# Patient Record
(demographics unavailable — no encounter records)

---

## 2025-01-03 NOTE — ASSESSMENT
[FreeTextEntry1] : HODA has left grade 2-3 vesicoureteral reflux and is doing well clinically thus far on prophylactic antibiotics and serial imaging. Prophylaxis was adjusted for weight but no other modifications in management was made at this time. The next visit in 6 months will be after a VCUG to determine if the reflux has resolved.

## 2025-01-03 NOTE — REASON FOR VISIT
[Initial Consultation] : an initial consultation [Hydronephrosis] : hydronephrosis [Parents] : parents [TextBox_8] : Dr. Kandice Barber

## 2025-01-03 NOTE — CONSULT LETTER
[FreeTextEntry1] : Dear Dr. ROXIE MOHR ,  I had the pleasure of seeing  HODA LYMAN for follow up today.  Below is my note regarding the office visit today.  Thank you so very much for allowing me to participate in HODA's  care.  Please don't hesitate to call me should any questions or issues arise .  Sincerely,   Sefernio Servin MD, FACS, [facility] Chief, Pediatric Urology Professor of Urology and Pediatrics [employer]  President, American U[employer] Association - [place] Section Past-President, Societies for Pediatric Urology

## 2025-01-03 NOTE — CONSULT LETTER
[FreeTextEntry1] : Dear Dr. ROIXE MOHR ,  I had the pleasure of seeing  HOAD LYMAN for follow up today.  Below is my note regarding the office visit today.  Thank you so very much for allowing me to participate in HODA's  care.  Please don't hesitate to call me should any questions or issues arise .  Sincerely,   Seferino Servin MD, FACS, [facility] Chief, Pediatric Urology Professor of Urology and Pediatrics [facility] [employer]  President, American U[employer] Association - [place] Section Past-President, Societies for Pediatric Urology

## 2025-01-03 NOTE — HISTORY OF PRESENT ILLNESS
[TextBox_4] : HODA is here for a follow up visit for hydronephrosis.  A renal ultrasound (6/13/24) demonstrated bilateral grade 1 hydronephrosis.  USVCUG (7/1024) demonstrated left grade 2-3 reflux.  She returns today for repeat renal/bladder ultrasounds.  Since the last visit, he has been well without any UTIs, unexplained fevers, voiding complaints, issues feeding.

## 2025-02-21 NOTE — CONSULT LETTER
[FreeTextEntry1] : Dear Dr. ROXIE MOHR ,  I had the pleasure of seeing  HODA LYMAN for follow up today.  Below is my note regarding the office visit today.  Thank you so very much for allowing me to participate in HODA's  care.  Please don't hesitate to call me should any questions or issues arise .  Sincerely,   Seferino Servin MD, FACS, [facility] Chief, Pediatric Urology Professor of Urology and Pediatrics [facility] [employer]  President, American U[employer] Association - [place] Section Past-President, Societies for Pediatric Urology

## 2025-02-21 NOTE — CONSULT LETTER
[FreeTextEntry1] : Dear Dr. ROXIE MOHR ,  I had the pleasure of seeing  HODA LYMAN for follow up today.  Below is my note regarding the office visit today.  Thank you so very much for allowing me to participate in HODA's  care.  Please don't hesitate to call me should any questions or issues arise .  Sincerely,   Seferino Servin MD, FACS, [facility] Chief, Pediatric Urology Professor of Urology and Pediatrics [facility] [employer]  President, American U[employer] Association - [place] Section Past-President, Societies for Pediatric Urology

## 2025-02-21 NOTE — HISTORY OF PRESENT ILLNESS
[TextBox_4] : HODA is here for a follow up visit for hydronephrosis. A renal ultrasound (6/13/24) demonstrated bilateral grade 1 hydronephrosis. USVCUG (7/1024) demonstrated left grade 2-3 reflux. She returns today for repeat renal/bladder ultrasounds. Since the last visit, he has been well without any UTIs, unexplained fevers, voiding complaints, issues feeding. Bactrim prophylaxis without side effects.

## 2025-04-05 NOTE — CONSULT LETTER
[FreeTextEntry1] : Dear Dr. ROXIE MOHR ,  I had the pleasure of seeing  HODA LYMAN for follow up today.  Below is my note regarding the office visit today.  Thank you so very much for allowing me to participate in HODA's  care.  Please don't hesitate to call me should any questions or issues arise .  Sincerely,   Seferino Servin MD, FACS, [facility] Chief, Pediatric Urology Professor of Urology and Pediatrics [facility] [employer]  President, American U[employer] Association - [place] Section Past-President, Societies for Pediatric Urology

## 2025-04-05 NOTE — CONSULT LETTER
[FreeTextEntry1] : Dear Dr. ROXIE MOHR ,  I had the pleasure of seeing  HODA LYMAN for follow up today.  Below is my note regarding the office visit today.  Thank you so very much for allowing me to participate in HODA's  care.  Please don't hesitate to call me should any questions or issues arise .  Sincerely,   Seferino Servin MD, FACS, [facility] Chief, Pediatric Urology Professor of Urology and Pediatrics [facility] [employer]  President, American U[employer] Association - [place] Section Past-President, Societies for Pediatric Urology

## 2025-04-05 NOTE — HISTORY OF PRESENT ILLNESS
[TextBox_4] : HODA is here for a follow up visit for hydronephrosis. A renal ultrasound (6/13/24) demonstrated bilateral grade 1 hydronephrosis. USVCUG (7/1024) demonstrated left grade 2-3 reflux. She returns today for repeat renal/bladder ultrasounds. Since the last visit, he has been well without any UTIs, unexplained fevers, voiding complaints, issues feeding. Bactrim prophylaxis without side effects.  Deep Sutures: 5-0 Vicryl

## 2025-06-26 NOTE — REASON FOR VISIT
[Home] : at home, [unfilled] , at the time of the visit. [Medical Office: ([facility])___] : at the medical office located in  [Telehealth (audio & video)] : This visit was provided via telehealth using real-time 2-way audio visual technology. [Follow-Up Visit] : a follow-up visit [Hydronephrosis] : hydronephrosis [VUR] : vesicoureteral reflux [Parents] : parents

## 2025-06-26 NOTE — REASON FOR VISIT
[Home] : at home, [unfilled] , at the time of the visit. [Medical Office: ([facility])___] : at the medical office located in  [Telehealth (audio & video)] : This visit was provided via telehealth using real-time 2-way audio visual technology. [Follow-Up Visit] : a follow-up visit [Hydronephrosis] : hydronephrosis [VUR] : vesicoureteral reflux [Parents] : parents

## 2025-06-26 NOTE — REASON FOR VISIT
[Home] : at home, [unfilled] , at the time of the visit. [Medical Office: ([facility])___] : at the medical office located in  [Telehealth (audio & video)] : This visit was provided via telehealth using real-time 2-way audio visual technology. [Follow-Up Visit] : a follow-up visit [Hydronephrosis] : hydronephrosis [VUR] : vesicoureteral reflux [Parents] : parents

## 2025-06-26 NOTE — REASON FOR VISIT
[Home] : at home, [unfilled] , at the time of the visit. [Medical Office: ([facility])___] : at the medical office located in  [Telehealth (audio & video)] : This visit was provided via telehealth using real-time 2-way audio visual technology. [Follow-Up Visit] : a follow-up visit [Hydronephrosis] : hydronephrosis [VUR] : vesicoureteral reflux [Parents] : parents

## 2025-07-16 NOTE — ASSESSMENT
[FreeTextEntry1] : Sofie has unchanged left grade 2-3 left vesicoureteral reflux.  After a long discussion regarding reflux and its management including observation, prophylactic antibiotics, Deflux injection or ureteral reimplantation, as well as answering all questions regarding the risks and benefits of each option, the decision to maintain prophylactic antibiotics and serial imaging (sonogram in 6 months and VCUG in 1 year) was made.

## 2025-07-16 NOTE — CONSULT LETTER
[FreeTextEntry1] : Dear Dr. ROXIE MOHR,      I had the pleasure of seeing HODA LYMAN for follow up today.  Below is my note regarding the office visit today.      Thank you so very much for allowing me to participate in HODA's care.  Please don't hesitate to call me should any questions or issues arise.         Sincerely,     Seferino Servin MD, FACS, [facility]    Chief, Pediatric Urology     Professor of Urology and Pediatrics     [employer] [facility] [employer]         President, American Urological Association - [place] Section    Past-President, Societies for Pediatric Urology

## 2025-07-16 NOTE — CONSULT LETTER
[FreeTextEntry1] : Dear Dr. ROXIE MOHR,      I had the pleasure of seeing HODA LYMAN for follow up today.  Below is my note regarding the office visit today.      Thank you so very much for allowing me to participate in HODA's care.  Please don't hesitate to call me should any questions or issues arise.         Sincerely,     Seferino Servin MD, FACS, [facility]    Chief, Pediatric Urology     Professor of Urology and Pediatrics     [facility] [employer]         President, American Urological Association - [place] Section    Past-President, Societies for Pediatric Urology

## 2025-07-16 NOTE — CONSULT LETTER
[FreeTextEntry1] : Dear Dr. ROXIE MOHR,      I had the pleasure of seeing HODA LYMAN for follow up today.  Below is my note regarding the office visit today.      Thank you so very much for allowing me to participate in HODA's care.  Please don't hesitate to call me should any questions or issues arise.         Sincerely,     Seferino Servin MD, FACS, [facility]    Chief, Pediatric Urology     Professor of Urology and Pediatrics     [facility] [employer]         President, American Urological Association - [place] Section    Past-President, Societies for Pediatric Urology

## 2025-07-16 NOTE — HISTORY OF PRESENT ILLNESS
[TextBox_4] : I verified the identity of the patient and the reason for the appointment with the parent. I explained to the parent that telemedicine encounters are not the same as a direct patient/healthcare provider visit because the patient and healthcare provider are not in the same room, which can result in limitations, including with the physical examination. I explained that the telemedicine encounter may require the patient's genitalia to be shown. I explained that after the telemedicine encounter, the patient may require an office visit for an in-person physical examination, ultrasound, or other testing. I informed the parent that there may be privacy risks associated with the use of the technology and that there may be costs associated with the encounter. I offered the option of an office visit rather than a telemedicine encounter. Parent stated that all explanations were understood, and that all questions were answered to their satisfaction. The parent verbalized their preference and consent to proceed with the telemedicine encounter.   HODA is here for a follow up visit for hydronephrosis.   A renal ultrasound (6/13/24) demonstrated bilateral grade 1 hydronephrosis.  Repeat renal/bladder ultrasounds (4/8/25) demonstrated bilateral grade 1 hydronephrosis.   USVCUG (7/10/24) demonstrated left grade 2-3 reflux.  Repeat USVCUG (7/10/25) demonstrated unchanged left grade 2-3 reflux.  Returns to review these results.   Since the last visit, he has been well without any UTIs, unexplained fevers, voiding complaints, issues feeding. Bactrim prophylaxis without side effects.

## 2025-07-16 NOTE — CONSULT LETTER
[FreeTextEntry1] : Dear Dr. ROXIE MOHR,      I had the pleasure of seeing HODA LYMAN for follow up today.  Below is my note regarding the office visit today.      Thank you so very much for allowing me to participate in HODA's care.  Please don't hesitate to call me should any questions or issues arise.         Sincerely,     Seferino Servin MD, FACS, [facility]    Chief, Pediatric Urology     Professor of Urology and Pediatrics     [facility] [employer]         President, American U[employer] Association - [place] Section    Past-President, Societies for Pediatric Urology